# Patient Record
Sex: MALE | Race: WHITE | URBAN - METROPOLITAN AREA
[De-identification: names, ages, dates, MRNs, and addresses within clinical notes are randomized per-mention and may not be internally consistent; named-entity substitution may affect disease eponyms.]

---

## 2019-12-18 ENCOUNTER — APPOINTMENT (OUTPATIENT)
Dept: CT IMAGING | Facility: HOSPITAL | Age: 34
End: 2019-12-18
Payer: COMMERCIAL

## 2019-12-18 ENCOUNTER — HOSPITAL ENCOUNTER (EMERGENCY)
Facility: HOSPITAL | Age: 34
Discharge: HOME | End: 2019-12-18
Payer: COMMERCIAL

## 2019-12-18 VITALS
TEMPERATURE: 98 F | HEIGHT: 72 IN | BODY MASS INDEX: 25.73 KG/M2 | HEART RATE: 80 BPM | SYSTOLIC BLOOD PRESSURE: 127 MMHG | OXYGEN SATURATION: 98 % | RESPIRATION RATE: 16 BRPM | DIASTOLIC BLOOD PRESSURE: 88 MMHG | WEIGHT: 190 LBS

## 2019-12-18 DIAGNOSIS — R51.9 ACUTE NONINTRACTABLE HEADACHE, UNSPECIFIED HEADACHE TYPE: Primary | ICD-10-CM

## 2019-12-18 PROCEDURE — 99285 EMERGENCY DEPT VISIT HI MDM: CPT

## 2019-12-18 PROCEDURE — 81001 URINALYSIS AUTO W/SCOPE: CPT

## 2019-12-18 PROCEDURE — 96375 TX/PRO/DX INJ NEW DRUG ADDON: CPT

## 2019-12-18 PROCEDURE — 96374 THER/PROPH/DIAG INJ IV PUSH: CPT

## 2019-12-18 PROCEDURE — 96376 TX/PRO/DX INJ SAME DRUG ADON: CPT

## 2019-12-18 PROCEDURE — 70450 CT HEAD/BRAIN W/O DYE: CPT

## 2019-12-18 PROCEDURE — 96361 HYDRATE IV INFUSION ADD-ON: CPT

## 2019-12-18 RX ORDER — LORAZEPAM 2 MG/ML
1 INJECTION INTRAMUSCULAR ONCE
Status: COMPLETED | OUTPATIENT
Start: 2019-12-18 | End: 2019-12-18

## 2019-12-18 RX ORDER — DEXAMETHASONE SODIUM PHOSPHATE 10 MG/ML
10 INJECTION, SOLUTION INTRAMUSCULAR; INTRAVENOUS ONCE
Status: COMPLETED | OUTPATIENT
Start: 2019-12-18 | End: 2019-12-18

## 2019-12-18 RX ORDER — KETOROLAC TROMETHAMINE 30 MG/ML
30 INJECTION, SOLUTION INTRAMUSCULAR; INTRAVENOUS ONCE
Status: COMPLETED | OUTPATIENT
Start: 2019-12-18 | End: 2019-12-18

## 2019-12-18 RX ORDER — LORAZEPAM 2 MG/ML
0.5 INJECTION INTRAMUSCULAR ONCE
Status: COMPLETED | OUTPATIENT
Start: 2019-12-18 | End: 2019-12-18

## 2019-12-18 RX ORDER — METOCLOPRAMIDE HYDROCHLORIDE 5 MG/ML
10 INJECTION INTRAMUSCULAR; INTRAVENOUS ONCE
Status: DISCONTINUED | OUTPATIENT
Start: 2019-12-18 | End: 2019-12-18

## 2019-12-18 RX ORDER — PROCHLORPERAZINE EDISYLATE 5 MG/ML
10 INJECTION INTRAMUSCULAR; INTRAVENOUS ONCE
Status: COMPLETED | OUTPATIENT
Start: 2019-12-18 | End: 2019-12-18

## 2019-12-18 RX ORDER — DIPHENHYDRAMINE HYDROCHLORIDE 50 MG/ML
25 INJECTION INTRAMUSCULAR; INTRAVENOUS ONCE
Status: COMPLETED | OUTPATIENT
Start: 2019-12-18 | End: 2019-12-18

## 2019-12-18 NOTE — ED NOTES
Patient states that the compazine has made him anxious and requesting medication. Patient requesting medication for anxiety. Ativan ordered and administered.

## 2019-12-18 NOTE — ED NOTES
Patient re-assesed. Patient was sleeping prior to re-assessment. Patient states that he still feels anxious, but less.

## 2019-12-18 NOTE — ED PROVIDER NOTES
Patient Seen in: [facility] AND [facility] Emergency Department    History   CC: head pain  HPI: Daryl Soriano 37year old male  who presents to the ER c/o right sided \"sharp shooting\" pain that is intermittent in nature x3 days w/ blurred vision.  +recently see Corrected    General - Appears well, non-toxic and in NAD  Head - Appears symmetrical without deformity/swelling cranium, scalp, or facial bones, no tenderness on palpation throughout, TMJ bilat without crepitus or limited ROM  Eyes - PERRL, sclera not inj parietal scalp near the vertex (series 3, image 78).   Correlate with physical exam to exclude other dermatologic process.        Dictated by (CST): Aissatou Milner MD on 12/18/2019 at 13:31       Approved by (CST): Aissatou Milner MD on 12/18/2019 at 13:35       precautions for ER reevaluation. He demonstrates understanding of instruction and agrees with plan of care. Patient was also evaluated by Dr. Kesha Castro who agrees with plan of care.     Disposition and Plan     Clinical Impression:  Acute nonintractable he

## 2019-12-18 NOTE — ED INITIAL ASSESSMENT (HPI)
Pt with intermittent sharp shooting pain on the left side of the head associated with blurry vision x 3 days. Pt c/o photophobia and smell sensitivity. Pt states he's been a lot of stress lately and related those symptoms that.

## (undated) NOTE — ED AVS SNAPSHOT
Nasim Perea   MRN: [id number]    Department:  [facility] Emergency Department   Date of Visit:  12/18/2019           Disclosure     Insurance plans vary and the physician(s) referred by the ER may not be covered by your plan.  Please contact yo CARE PHYSICIAN AT ONCE OR RETURN IMMEDIATELY TO THE EMERGENCY DEPARTMENT. If you have been prescribed any medication(s), please fill your prescription right away and begin taking the medication(s) as directed.   If you believe that any of the medications